# Patient Record
Sex: MALE | Race: WHITE | HISPANIC OR LATINO | Employment: UNEMPLOYED | URBAN - METROPOLITAN AREA
[De-identification: names, ages, dates, MRNs, and addresses within clinical notes are randomized per-mention and may not be internally consistent; named-entity substitution may affect disease eponyms.]

---

## 2024-01-01 ENCOUNTER — HOSPITAL ENCOUNTER (EMERGENCY)
Facility: CLINIC | Age: 29
Discharge: HOME | End: 2024-01-01
Attending: EMERGENCY MEDICINE | Admitting: EMERGENCY MEDICINE

## 2024-01-01 VITALS
SYSTOLIC BLOOD PRESSURE: 153 MMHG | DIASTOLIC BLOOD PRESSURE: 86 MMHG | HEART RATE: 89 BPM | TEMPERATURE: 98.8 F | RESPIRATION RATE: 18 BRPM | OXYGEN SATURATION: 98 %

## 2024-01-01 DIAGNOSIS — F10.920 ALCOHOLIC INTOXICATION WITHOUT COMPLICATION (H): ICD-10-CM

## 2024-01-01 PROCEDURE — 99285 EMERGENCY DEPT VISIT HI MDM: CPT

## 2024-01-01 ASSESSMENT — ACTIVITIES OF DAILY LIVING (ADL): ADLS_ACUITY_SCORE: 35

## 2024-01-01 NOTE — ED PROVIDER NOTES
History     Chief Complaint:  Alcohol Intoxication (Pt arrives via EMS on a  Hold. Pt was a passenger in a vehicle that was pulled over and  arrested. Officers report they were unable to wake pt up for approx 20 minutes. Breathalyzer 0.165. Pt awake and alert here. Denies drug use.)       A  was used (Turkish).      Miller Silver is a 25 year old male who presents with alcohol intoxication. The patient was a passenger in a vehicle that was pulled over by the police. The  was arrested for a DUI, and the patient was unable to be waken up by the officer for 20 minutes. He does not remember how he got into the car. The patient states that he drank beer, but he denies doing drugs. He otherwise feels well and normal.    Independent Historian:   Independent historian  I reviewed the hold paperwork     Review of External Notes:   None     Medications:    The patient is not currently taking any prescribed medications.    Past Medical History:    The patient denies any significant past medical history.    Physical Exam   Patient Vitals for the past 24 hrs:   BP Temp Temp src Pulse Resp SpO2   01/01/24 0155 -- -- -- -- 18 --   01/01/24 0153 -- -- -- 89 -- 100 %   01/01/24 0152 (!) 153/86 98.8  F (37.1  C) Oral -- -- --        Physical Exam  General: Sitting up in bed  Eyes:  The pupils are equal and round    Conjunctivae and sclerae are normal  ENT:    Atraumatic face  Neck:  Normal range of motion  CV:  Regular rhythm, regular rate     Skin warm and well perfused   Resp:  Non labored breathing on room air    No tachypnea    No cough heard  MS:  Normal muscular tone  Skin:  No rash or acute skin lesions noted  Neuro:   Awake, alert.      Speech is normal and fluent.    Face is symmetric.     Moves all extremities equally  Psych: Normal affect.  Appropriate interactions.    Emergency Department Course     Emergency Department Course & Assessments:    Assessments:  0230 I  obtained history and examined the patient as noted above    Independent Interpretation (X-rays, CTs, rhythm strip):  None    Consultations/Discussion of Management or Tests:  None        Social Determinants of Health affecting care:   None    Disposition:  The patient was discharged to home.     Impression & Plan    CMS Diagnoses: None    Medical Decision Making:  Miller Diaz is a 25 year old male who presents for evaluation of alcohol intoxication.  There are no signs of co-ingestion including acetaminophen, drugs, medications, volatile alcohols. He has no signs of trauma related to alcohol use and no further workup is needed including head CT.  He looks well.  Appears clinically sober.  Will attempt to get a ride home.  No mental health concerns.    Diagnosis:    ICD-10-CM    1. Alcoholic intoxication without complication (H24)  F10.920          Scribe Disclosure:  I, Isael Padgett, am serving as a scribe at 2:26 AM on 1/1/2024 to document services personally performed by Amina Good MD based on my observations and the provider's statements to me.   1/1/2024   Amina Good MD Goertz, Maria Kristine, MD  01/01/24 0654

## 2024-01-01 NOTE — DISCHARGE INSTRUCTIONS

## 2024-01-01 NOTE — ED NOTES
Bed: ED20  Expected date:   Expected time:   Means of arrival:   Comments:  544 25M ETOH unresponsive, now awake. SHARON

## 2024-01-01 NOTE — ED TRIAGE NOTES
Pt arrives via EMS on  Hold after pt was a passenger in a vehicle that was pulled over.  went to [facility]. Police report they were unable to wake pt up for approx 20 minutes. Breathalyzer 0.165. Pt denies drug use. Reports drinking six beers. States he has someone who can pick him up. Awake and conversant on arrival.     Triage Assessment (Adult)       Row Name 01/01/24 0157          Triage Assessment    Airway WDL WDL        Respiratory WDL    Respiratory WDL WDL        Skin Circulation/Temperature WDL    Skin Circulation/Temperature WDL WDL        Cardiac WDL    Cardiac WDL WDL        Peripheral/Neurovascular WDL    Peripheral Neurovascular WDL WDL        Cognitive/Neuro/Behavioral WDL    Cognitive/Neuro/Behavioral WDL WDL